# Patient Record
Sex: FEMALE
[De-identification: names, ages, dates, MRNs, and addresses within clinical notes are randomized per-mention and may not be internally consistent; named-entity substitution may affect disease eponyms.]

---

## 2017-09-01 PROBLEM — Z00.00 ENCOUNTER FOR PREVENTIVE HEALTH EXAMINATION: Status: ACTIVE | Noted: 2017-09-01

## 2017-11-08 ENCOUNTER — TRANSCRIPTION ENCOUNTER (OUTPATIENT)
Age: 42
End: 2017-11-08

## 2024-08-27 RX ORDER — HYDROMORPHONE HYDROCHLORIDE 2 MG/1
1 TABLET ORAL ONCE
Refills: 0 | Status: DISCONTINUED | OUTPATIENT
Start: 2024-08-28 | End: 2024-08-28

## 2024-08-27 RX ORDER — ONDANSETRON 2 MG/ML
4 INJECTION, SOLUTION INTRAMUSCULAR; INTRAVENOUS ONCE
Refills: 0 | Status: DISCONTINUED | OUTPATIENT
Start: 2024-08-28 | End: 2024-08-28

## 2024-08-27 NOTE — ASU PATIENT PROFILE, ADULT - NSICDXPASTMEDICALHX_GEN_ALL_CORE_FT
PAST MEDICAL HISTORY:  Adenomyosis of the uterus     Hyperlipidemia     Hypertension     Type 1 diabetes mellitus

## 2024-08-27 NOTE — ASU PATIENT PROFILE, ADULT - BLOOD AVOIDANCE/RESTRICTIONS, PROFILE
"Procedures    Shoulder Subacromial Injection    PREOPERATIVE DIAGNOSIS: Shoulder pain    POSTOPERATIVE DIAGNOSIS: Shoulder pain    PROCEDURE PERFORMED: BILATERAL SUBACROMIAL SHOULDER INJECTION    The patient understands the risks and benefits of the procedure and wishes to proceed. The patient was seen in the preoperative area. Patient's consent was obtained and updated. Vitals were taken. Patient was then placed in a seated position for the injection. Site marked for a lateral approach and prepped with alcohol swabs x 4. A 25 gauge 1.5\"  needle was introduced into the joint space and 3mL of steroid solution containing 2mL 0.25% bupivacaine, and 1mL 40mg Depomedrol was injected after negative aspiration without resistance. The procedure was repeated in all respects on the opposite side. The patient tolerated with no parth-procedural complications.  A sterile dressing was placed over the puncture site.    "
none

## 2024-08-28 ENCOUNTER — TRANSCRIPTION ENCOUNTER (OUTPATIENT)
Age: 49
End: 2024-08-28

## 2024-08-28 ENCOUNTER — OUTPATIENT (OUTPATIENT)
Dept: OUTPATIENT SERVICES | Facility: HOSPITAL | Age: 49
LOS: 1 days | End: 2024-08-28
Payer: COMMERCIAL

## 2024-08-28 VITALS
WEIGHT: 151.9 LBS | DIASTOLIC BLOOD PRESSURE: 67 MMHG | HEART RATE: 76 BPM | SYSTOLIC BLOOD PRESSURE: 139 MMHG | RESPIRATION RATE: 14 BRPM | TEMPERATURE: 98 F | OXYGEN SATURATION: 99 %

## 2024-08-28 VITALS
SYSTOLIC BLOOD PRESSURE: 119 MMHG | DIASTOLIC BLOOD PRESSURE: 44 MMHG | RESPIRATION RATE: 17 BRPM | OXYGEN SATURATION: 99 % | HEART RATE: 79 BPM

## 2024-08-28 DIAGNOSIS — N80.03 ADENOMYOSIS OF THE UTERUS: ICD-10-CM

## 2024-08-28 DIAGNOSIS — N92.0 EXCESSIVE AND FREQUENT MENSTRUATION WITH REGULAR CYCLE: ICD-10-CM

## 2024-08-28 DIAGNOSIS — Z98.818 OTHER DENTAL PROCEDURE STATUS: Chronic | ICD-10-CM

## 2024-08-28 DIAGNOSIS — Z01.818 ENCOUNTER FOR OTHER PREPROCEDURAL EXAMINATION: ICD-10-CM

## 2024-08-28 DIAGNOSIS — D25.9 LEIOMYOMA OF UTERUS, UNSPECIFIED: ICD-10-CM

## 2024-08-28 LAB
ABO RH CONFIRMATION: SIGNIFICANT CHANGE UP
GLUCOSE BLDC GLUCOMTR-MCNC: 177 MG/DL — HIGH (ref 70–99)
GLUCOSE BLDC GLUCOMTR-MCNC: 192 MG/DL — HIGH (ref 70–99)
GLUCOSE BLDC GLUCOMTR-MCNC: 192 MG/DL — HIGH (ref 70–99)
GLUCOSE BLDC GLUCOMTR-MCNC: 215 MG/DL — HIGH (ref 70–99)
HCG UR QL: NEGATIVE — SIGNIFICANT CHANGE UP
HCT VFR BLD CALC: 37 % — SIGNIFICANT CHANGE UP (ref 34.5–45)
HGB BLD-MCNC: 11.9 G/DL — SIGNIFICANT CHANGE UP (ref 11.5–15.5)
MCHC RBC-ENTMCNC: 29.6 PG — SIGNIFICANT CHANGE UP (ref 27–34)
MCHC RBC-ENTMCNC: 32.2 GM/DL — SIGNIFICANT CHANGE UP (ref 32–36)
MCV RBC AUTO: 92 FL — SIGNIFICANT CHANGE UP (ref 80–100)
NRBC # BLD: 0 /100 WBCS — SIGNIFICANT CHANGE UP (ref 0–0)
PLATELET # BLD AUTO: 313 K/UL — SIGNIFICANT CHANGE UP (ref 150–400)
RBC # BLD: 4.02 M/UL — SIGNIFICANT CHANGE UP (ref 3.8–5.2)
RBC # FLD: 14.1 % — SIGNIFICANT CHANGE UP (ref 10.3–14.5)
WBC # BLD: 14.41 K/UL — HIGH (ref 3.8–10.5)
WBC # FLD AUTO: 14.41 K/UL — HIGH (ref 3.8–10.5)

## 2024-08-28 PROCEDURE — 36415 COLL VENOUS BLD VENIPUNCTURE: CPT

## 2024-08-28 PROCEDURE — 88302 TISSUE EXAM BY PATHOLOGIST: CPT | Mod: 26

## 2024-08-28 PROCEDURE — 88302 TISSUE EXAM BY PATHOLOGIST: CPT

## 2024-08-28 PROCEDURE — 81025 URINE PREGNANCY TEST: CPT

## 2024-08-28 PROCEDURE — C1889: CPT

## 2024-08-28 PROCEDURE — C9399: CPT

## 2024-08-28 PROCEDURE — 82962 GLUCOSE BLOOD TEST: CPT

## 2024-08-28 PROCEDURE — 58573 TLH W/T/O UTERUS OVER 250 G: CPT

## 2024-08-28 PROCEDURE — 88307 TISSUE EXAM BY PATHOLOGIST: CPT

## 2024-08-28 PROCEDURE — 85027 COMPLETE CBC AUTOMATED: CPT

## 2024-08-28 PROCEDURE — 88307 TISSUE EXAM BY PATHOLOGIST: CPT | Mod: 26

## 2024-08-28 DEVICE — ARISTA 3GR
Type: IMPLANTABLE DEVICE | Status: NON-FUNCTIONAL
Removed: 2024-08-28

## 2024-08-28 RX ORDER — HYDROMORPHONE HYDROCHLORIDE 2 MG/1
0.5 TABLET ORAL
Refills: 0 | Status: DISCONTINUED | OUTPATIENT
Start: 2024-08-28 | End: 2024-08-28

## 2024-08-28 RX ORDER — ACETAMINOPHEN 325 MG/1
1000 TABLET ORAL ONCE
Refills: 0 | Status: COMPLETED | OUTPATIENT
Start: 2024-08-28 | End: 2024-08-28

## 2024-08-28 RX ORDER — HYDROMORPHONE HYDROCHLORIDE 2 MG/1
0.5 TABLET ORAL ONCE
Refills: 0 | Status: DISCONTINUED | OUTPATIENT
Start: 2024-08-28 | End: 2024-08-28

## 2024-08-28 RX ORDER — CLINDAMYCIN PHOSPHATE 150 MG/ML
900 VIAL (ML) INJECTION ONCE
Refills: 0 | Status: COMPLETED | OUTPATIENT
Start: 2024-08-28 | End: 2024-08-28

## 2024-08-28 RX ORDER — GENTAMICIN 40 MG/ML
80 INJECTION, SOLUTION INTRAMUSCULAR; INTRAVENOUS ONCE
Refills: 0 | Status: COMPLETED | OUTPATIENT
Start: 2024-08-28 | End: 2024-08-28

## 2024-08-28 RX ADMIN — ACETAMINOPHEN 1000 MILLIGRAM(S): 325 TABLET ORAL at 14:21

## 2024-08-28 RX ADMIN — ACETAMINOPHEN 400 MILLIGRAM(S): 325 TABLET ORAL at 13:51

## 2024-08-28 RX ADMIN — HYDROMORPHONE HYDROCHLORIDE 0.5 MILLIGRAM(S): 2 TABLET ORAL at 11:36

## 2024-08-28 RX ADMIN — Medication 2000 MILLILITER(S): at 12:21

## 2024-08-28 RX ADMIN — HYDROMORPHONE HYDROCHLORIDE 0.5 MILLIGRAM(S): 2 TABLET ORAL at 11:23

## 2024-08-28 RX ADMIN — Medication 75 MILLILITER(S): at 11:08

## 2024-08-28 NOTE — ASU DISCHARGE PLAN (ADULT/PEDIATRIC) - NURSING INSTRUCTIONS
Next ibuprofen on or after 4:40pm if needed. Use incentive spirometer 10x/hour while awake for the next 2-3 days. Utilize "blow as you go" technique when making big movements/changes in position. Use pillow or hands to splint incisions/abdomen when coughing or sneezing. Next ibuprofen on/after 4:40pm. Use incentive spirometer 10x/hour while awake for the next 2-3 days. Utilize "blow as you go" technique when making big movements/changes in position. Use pillow or hands to splint incisions/abdomen when coughing or sneezing. Next ibuprofen on/after 4:40pm. Acetaminophen 1000mg given today at 8:00am and 1000mg at 2:00pm. Do not combine additional acetaminophen with prescribed pain medication which also contains acetaminophen.  Use incentive spirometer 10x/hour while awake for the next 2-3 days. Utilize "blow as you go" technique when making big movements/changes in position. Use pillow or hands to splint incisions/abdomen when coughing or sneezing.

## 2024-08-28 NOTE — BRIEF OPERATIVE NOTE - NSICDXBRIEFPREOP_GEN_ALL_CORE_FT
PRE-OP DIAGNOSIS:  Menorrhagia 28-Aug-2024 10:21:37  Heron Rene  Adenomyosis 28-Aug-2024 10:21:44  Heron Rene

## 2024-08-28 NOTE — BRIEF OPERATIVE NOTE - NSICDXBRIEFPOSTOP_GEN_ALL_CORE_FT
POST-OP DIAGNOSIS:  Adenomyosis 28-Aug-2024 10:22:08  Heron Rene  Menorrhagia 28-Aug-2024 10:21:52  Heron Rene

## 2024-08-28 NOTE — ASU DISCHARGE PLAN (ADULT/PEDIATRIC) - CARE PROVIDER_API CALL
Heron Rene  Obstetrics and Gynecology  [address] [place] [address], [address]  [place] [address]  Phone: [phone]  Fax: [phone]  Follow Up Time:

## 2024-08-28 NOTE — ASU DISCHARGE PLAN (ADULT/PEDIATRIC) - ASU DC SPECIAL INSTRUCTIONSFT
Call the office to schedule a post-operative appointment in 2 weeks.    FOR URGENT POSTOPERATIVE PROBLEMS, CALL LYDIA AT DR. CAMPO'S SURGICAL HOTLINE: [phone].

## 2024-08-28 NOTE — BRIEF OPERATIVE NOTE - OPERATION/FINDINGS
Uterus 12-14 weeks, globular with suspected adenomyosis. Normal tubes and ovaries bilaterally; normal upper abdomen. Uneventful total laparoscopic hysterectomy, with bulky uterus removed through vagina in pieces. Cystoscopy demonstrated intact bladder with good ureteral jets bilaterally at case conclusion. EBL = 100 cc.

## 2024-08-28 NOTE — BRIEF OPERATIVE NOTE - NSICDXBRIEFPROCEDURE_GEN_ALL_CORE_FT
PROCEDURES:  Laparoscopic total hysterectomy with bilateral salpingectomy and cystoscopy 28-Aug-2024 10:21:28  Heron Rene

## 2024-08-28 NOTE — ASU DISCHARGE PLAN (ADULT/PEDIATRIC) - CALL YOUR DOCTOR IF YOU HAVE ANY OF THE FOLLOWING:
Bleeding that does not stop/Pain not relieved by Medications/Fever greater than (need to indicate Fahrenheit or Celsius)/Wound/Surgical Site with redness, or foul smelling discharge or pus/Nausea and vomiting that does not stop/Unable to urinate/Inability to tolerate liquids or foods Bleeding that does not stop/Swelling that gets worse/Pain not relieved by Medications/Fever greater than (need to indicate Fahrenheit or Celsius)/Wound/Surgical Site with redness, or foul smelling discharge or pus/Nausea and vomiting that does not stop/Unable to urinate/Inability to tolerate liquids or foods

## 2024-08-28 NOTE — PRE-OP CHECKLIST - INTERNAL PROSTHESES
Outreach attempt was made to schedule a Medicare Wellness Visit. This was the second attempt. Contact was made, [facility] appointment refused.   insulin pump to right flank

## 2024-09-09 LAB — SURGICAL PATHOLOGY STUDY: SIGNIFICANT CHANGE UP

## 2024-09-19 ENCOUNTER — INPATIENT (INPATIENT)
Facility: HOSPITAL | Age: 49
LOS: 0 days | Discharge: HOME | DRG: 379 | End: 2024-09-20
Attending: HOSPITALIST | Admitting: INTERNAL MEDICINE
Payer: COMMERCIAL

## 2024-09-19 VITALS
RESPIRATION RATE: 18 BRPM | WEIGHT: 154.98 LBS | TEMPERATURE: 98 F | HEART RATE: 128 BPM | DIASTOLIC BLOOD PRESSURE: 79 MMHG | HEIGHT: 63 IN | OXYGEN SATURATION: 97 % | SYSTOLIC BLOOD PRESSURE: 119 MMHG

## 2024-09-19 DIAGNOSIS — K92.2 GASTROINTESTINAL HEMORRHAGE, UNSPECIFIED: ICD-10-CM

## 2024-09-19 DIAGNOSIS — I10 ESSENTIAL (PRIMARY) HYPERTENSION: ICD-10-CM

## 2024-09-19 DIAGNOSIS — E78.5 HYPERLIPIDEMIA, UNSPECIFIED: ICD-10-CM

## 2024-09-19 DIAGNOSIS — Z90.710 ACQUIRED ABSENCE OF BOTH CERVIX AND UTERUS: Chronic | ICD-10-CM

## 2024-09-19 DIAGNOSIS — E10.65 TYPE 1 DIABETES MELLITUS WITH HYPERGLYCEMIA: ICD-10-CM

## 2024-09-19 DIAGNOSIS — Z98.818 OTHER DENTAL PROCEDURE STATUS: Chronic | ICD-10-CM

## 2024-09-19 DIAGNOSIS — Z29.9 ENCOUNTER FOR PROPHYLACTIC MEASURES, UNSPECIFIED: ICD-10-CM

## 2024-09-19 DIAGNOSIS — E10.9 TYPE 1 DIABETES MELLITUS WITHOUT COMPLICATIONS: ICD-10-CM

## 2024-09-19 PROBLEM — N80.03 ADENOMYOSIS OF THE UTERUS: Chronic | Status: ACTIVE | Noted: 2024-08-14

## 2024-09-19 LAB
ALBUMIN SERPL ELPH-MCNC: 3.5 G/DL — SIGNIFICANT CHANGE UP (ref 3.3–5)
ALP SERPL-CCNC: 92 U/L — SIGNIFICANT CHANGE UP (ref 40–120)
ALT FLD-CCNC: 47 U/L — SIGNIFICANT CHANGE UP (ref 12–78)
ANION GAP SERPL CALC-SCNC: 8 MMOL/L — SIGNIFICANT CHANGE UP (ref 5–17)
APTT BLD: 26.9 SEC — SIGNIFICANT CHANGE UP (ref 24.5–35.6)
AST SERPL-CCNC: 23 U/L — SIGNIFICANT CHANGE UP (ref 15–37)
BASOPHILS # BLD AUTO: 0.08 K/UL — SIGNIFICANT CHANGE UP (ref 0–0.2)
BASOPHILS NFR BLD AUTO: 1 % — SIGNIFICANT CHANGE UP (ref 0–2)
BILIRUB SERPL-MCNC: 0.5 MG/DL — SIGNIFICANT CHANGE UP (ref 0.2–1.2)
BLD GP AB SCN SERPL QL: SIGNIFICANT CHANGE UP
BUN SERPL-MCNC: 36 MG/DL — HIGH (ref 7–23)
CALCIUM SERPL-MCNC: 9.5 MG/DL — SIGNIFICANT CHANGE UP (ref 8.5–10.1)
CHLORIDE SERPL-SCNC: 103 MMOL/L — SIGNIFICANT CHANGE UP (ref 96–108)
CO2 SERPL-SCNC: 22 MMOL/L — SIGNIFICANT CHANGE UP (ref 22–31)
CREAT SERPL-MCNC: 1.2 MG/DL — SIGNIFICANT CHANGE UP (ref 0.5–1.3)
EGFR: 55 ML/MIN/1.73M2 — LOW
EOSINOPHIL # BLD AUTO: 0.22 K/UL — SIGNIFICANT CHANGE UP (ref 0–0.5)
EOSINOPHIL NFR BLD AUTO: 2.8 % — SIGNIFICANT CHANGE UP (ref 0–6)
GLUCOSE SERPL-MCNC: 332 MG/DL — HIGH (ref 70–99)
HCT VFR BLD CALC: 28 % — LOW (ref 34.5–45)
HCT VFR BLD CALC: 30.7 % — LOW (ref 34.5–45)
HCT VFR BLD CALC: 33.9 % — LOW (ref 34.5–45)
HGB BLD-MCNC: 10.8 G/DL — LOW (ref 11.5–15.5)
HGB BLD-MCNC: 9 G/DL — LOW (ref 11.5–15.5)
HGB BLD-MCNC: 9.8 G/DL — LOW (ref 11.5–15.5)
IMM GRANULOCYTES NFR BLD AUTO: 0.4 % — SIGNIFICANT CHANGE UP (ref 0–0.9)
INR BLD: 1.01 RATIO — SIGNIFICANT CHANGE UP (ref 0.85–1.18)
LIDOCAIN IGE QN: 22 U/L — SIGNIFICANT CHANGE UP (ref 13–75)
LYMPHOCYTES # BLD AUTO: 1.37 K/UL — SIGNIFICANT CHANGE UP (ref 1–3.3)
LYMPHOCYTES # BLD AUTO: 17.3 % — SIGNIFICANT CHANGE UP (ref 13–44)
MCHC RBC-ENTMCNC: 28.7 PG — SIGNIFICANT CHANGE UP (ref 27–34)
MCHC RBC-ENTMCNC: 28.8 PG — SIGNIFICANT CHANGE UP (ref 27–34)
MCHC RBC-ENTMCNC: 31.9 GM/DL — LOW (ref 32–36)
MCHC RBC-ENTMCNC: 31.9 GM/DL — LOW (ref 32–36)
MCV RBC AUTO: 90 FL — SIGNIFICANT CHANGE UP (ref 80–100)
MCV RBC AUTO: 90.4 FL — SIGNIFICANT CHANGE UP (ref 80–100)
MONOCYTES # BLD AUTO: 0.75 K/UL — SIGNIFICANT CHANGE UP (ref 0–0.9)
MONOCYTES NFR BLD AUTO: 9.5 % — SIGNIFICANT CHANGE UP (ref 2–14)
NEUTROPHILS # BLD AUTO: 5.46 K/UL — SIGNIFICANT CHANGE UP (ref 1.8–7.4)
NEUTROPHILS NFR BLD AUTO: 69 % — SIGNIFICANT CHANGE UP (ref 43–77)
NRBC # BLD: 0 /100 WBCS — SIGNIFICANT CHANGE UP (ref 0–0)
NRBC # BLD: 0 /100 WBCS — SIGNIFICANT CHANGE UP (ref 0–0)
OB PNL STL: POSITIVE
PLATELET # BLD AUTO: 417 K/UL — HIGH (ref 150–400)
PLATELET # BLD AUTO: 464 K/UL — HIGH (ref 150–400)
POTASSIUM SERPL-MCNC: 4.5 MMOL/L — SIGNIFICANT CHANGE UP (ref 3.5–5.3)
POTASSIUM SERPL-SCNC: 4.5 MMOL/L — SIGNIFICANT CHANGE UP (ref 3.5–5.3)
PROT SERPL-MCNC: 7.8 G/DL — SIGNIFICANT CHANGE UP (ref 6–8.3)
PROTHROM AB SERPL-ACNC: 11.5 SEC — SIGNIFICANT CHANGE UP (ref 9.5–13)
RBC # BLD: 3.41 M/UL — LOW (ref 3.8–5.2)
RBC # BLD: 3.75 M/UL — LOW (ref 3.8–5.2)
RBC # FLD: 13.9 % — SIGNIFICANT CHANGE UP (ref 10.3–14.5)
RBC # FLD: 14.1 % — SIGNIFICANT CHANGE UP (ref 10.3–14.5)
SODIUM SERPL-SCNC: 133 MMOL/L — LOW (ref 135–145)
WBC # BLD: 7.07 K/UL — SIGNIFICANT CHANGE UP (ref 3.8–10.5)
WBC # BLD: 7.91 K/UL — SIGNIFICANT CHANGE UP (ref 3.8–10.5)
WBC # FLD AUTO: 7.07 K/UL — SIGNIFICANT CHANGE UP (ref 3.8–10.5)
WBC # FLD AUTO: 7.91 K/UL — SIGNIFICANT CHANGE UP (ref 3.8–10.5)

## 2024-09-19 PROCEDURE — 99221 1ST HOSP IP/OBS SF/LOW 40: CPT

## 2024-09-19 PROCEDURE — 99285 EMERGENCY DEPT VISIT HI MDM: CPT

## 2024-09-19 PROCEDURE — 99223 1ST HOSP IP/OBS HIGH 75: CPT | Mod: GC

## 2024-09-19 PROCEDURE — 93010 ELECTROCARDIOGRAM REPORT: CPT

## 2024-09-19 PROCEDURE — 71045 X-RAY EXAM CHEST 1 VIEW: CPT | Mod: 26

## 2024-09-19 RX ORDER — AMLODIPINE BESYLATE 10 MG/1
10 TABLET ORAL DAILY
Refills: 0 | Status: DISCONTINUED | OUTPATIENT
Start: 2024-09-19 | End: 2024-09-20

## 2024-09-19 RX ORDER — ONDANSETRON 2 MG/ML
4 INJECTION, SOLUTION INTRAMUSCULAR; INTRAVENOUS EVERY 8 HOURS
Refills: 0 | Status: DISCONTINUED | OUTPATIENT
Start: 2024-09-19 | End: 2024-09-20

## 2024-09-19 RX ORDER — LISINOPRIL 10 MG/1
20 TABLET ORAL DAILY
Refills: 0 | Status: DISCONTINUED | OUTPATIENT
Start: 2024-09-19 | End: 2024-09-20

## 2024-09-19 RX ORDER — DEXTROSE 15 G/33 G
15 GEL IN PACKET (GRAM) ORAL ONCE
Refills: 0 | Status: DISCONTINUED | OUTPATIENT
Start: 2024-09-19 | End: 2024-09-20

## 2024-09-19 RX ORDER — PANTOPRAZOLE SODIUM 40 MG
40 TABLET, DELAYED RELEASE (ENTERIC COATED) ORAL EVERY 12 HOURS
Refills: 0 | Status: DISCONTINUED | OUTPATIENT
Start: 2024-09-19 | End: 2024-09-20

## 2024-09-19 RX ORDER — ACETAMINOPHEN 325 MG/1
650 TABLET ORAL EVERY 6 HOURS
Refills: 0 | Status: DISCONTINUED | OUTPATIENT
Start: 2024-09-19 | End: 2024-09-20

## 2024-09-19 RX ORDER — MAGNESIUM, ALUMINUM HYDROXIDE 200-225/5
30 SUSPENSION, ORAL (FINAL DOSE FORM) ORAL EVERY 4 HOURS
Refills: 0 | Status: DISCONTINUED | OUTPATIENT
Start: 2024-09-19 | End: 2024-09-20

## 2024-09-19 RX ORDER — ONDANSETRON 2 MG/ML
4 INJECTION, SOLUTION INTRAMUSCULAR; INTRAVENOUS ONCE
Refills: 0 | Status: COMPLETED | OUTPATIENT
Start: 2024-09-19 | End: 2024-09-19

## 2024-09-19 RX ORDER — DEXTROSE 15 G/33 G
12.5 GEL IN PACKET (GRAM) ORAL ONCE
Refills: 0 | Status: DISCONTINUED | OUTPATIENT
Start: 2024-09-19 | End: 2024-09-20

## 2024-09-19 RX ORDER — DEXTROSE 15 G/33 G
25 GEL IN PACKET (GRAM) ORAL ONCE
Refills: 0 | Status: DISCONTINUED | OUTPATIENT
Start: 2024-09-19 | End: 2024-09-20

## 2024-09-19 RX ORDER — GLUCAGON INJECTION, SOLUTION 1 MG/.2ML
1 INJECTION, SOLUTION SUBCUTANEOUS ONCE
Refills: 0 | Status: DISCONTINUED | OUTPATIENT
Start: 2024-09-19 | End: 2024-09-20

## 2024-09-19 RX ORDER — PANTOPRAZOLE SODIUM 40 MG
40 TABLET, DELAYED RELEASE (ENTERIC COATED) ORAL ONCE
Refills: 0 | Status: COMPLETED | OUTPATIENT
Start: 2024-09-19 | End: 2024-09-19

## 2024-09-19 RX ORDER — IBUPROFEN 600 MG
3 TABLET ORAL
Qty: 0 | Refills: 0 | DISCHARGE

## 2024-09-19 RX ORDER — OXYCODONE AND ACETAMINOPHEN 7.5; 325 MG/1; MG/1
1 TABLET ORAL
Qty: 0 | Refills: 0 | DISCHARGE

## 2024-09-19 RX ORDER — METOPROLOL TARTRATE 100 MG/1
100 TABLET ORAL DAILY
Refills: 0 | Status: DISCONTINUED | OUTPATIENT
Start: 2024-09-19 | End: 2024-09-20

## 2024-09-19 RX ORDER — SODIUM CHLORIDE 9 MG/ML
1000 INJECTION INTRAMUSCULAR; INTRAVENOUS; SUBCUTANEOUS ONCE
Refills: 0 | Status: COMPLETED | OUTPATIENT
Start: 2024-09-19 | End: 2024-09-19

## 2024-09-19 RX ORDER — SODIUM CHLORIDE 9 MG/ML
1000 INJECTION INTRAMUSCULAR; INTRAVENOUS; SUBCUTANEOUS
Refills: 0 | Status: DISCONTINUED | OUTPATIENT
Start: 2024-09-19 | End: 2024-09-20

## 2024-09-19 RX ADMIN — ACETAMINOPHEN 650 MILLIGRAM(S): 325 TABLET ORAL at 23:01

## 2024-09-19 RX ADMIN — Medication 40 MILLIGRAM(S): at 18:30

## 2024-09-19 RX ADMIN — Medication 40 MILLIGRAM(S): at 12:14

## 2024-09-19 RX ADMIN — LISINOPRIL 20 MILLIGRAM(S): 10 TABLET ORAL at 22:02

## 2024-09-19 RX ADMIN — AMLODIPINE BESYLATE 10 MILLIGRAM(S): 10 TABLET ORAL at 22:01

## 2024-09-19 RX ADMIN — Medication 3 MILLIGRAM(S): at 22:00

## 2024-09-19 RX ADMIN — Medication 20 MILLIGRAM(S): at 22:01

## 2024-09-19 RX ADMIN — ONDANSETRON 4 MILLIGRAM(S): 2 INJECTION, SOLUTION INTRAMUSCULAR; INTRAVENOUS at 18:31

## 2024-09-19 RX ADMIN — ACETAMINOPHEN 650 MILLIGRAM(S): 325 TABLET ORAL at 22:01

## 2024-09-19 RX ADMIN — SODIUM CHLORIDE 1000 MILLILITER(S): 9 INJECTION INTRAMUSCULAR; INTRAVENOUS; SUBCUTANEOUS at 12:13

## 2024-09-19 RX ADMIN — METOPROLOL TARTRATE 100 MILLIGRAM(S): 100 TABLET ORAL at 22:01

## 2024-09-19 NOTE — H&P ADULT - HISTORY OF PRESENT ILLNESS
48F with PMH of DM1, on insulin pump, HTN, HLD, adenomyosis of the uterus s/p hysterectomy presents to the ED for tarry stools. Pt had a hysterectomy three weeks ago. Pt took both ibuprofen and percocet for one day after surgery and only took percocet for the remainder of that first week. Had only one episode of vaginal bleeding after the surgery when she was also diagnosed with a UTI. Was treated but pt unable to recall medication name. No more instances of vaginal bleeding occurred after that one time. Pt had a recent visit with her Gyn, Dr. Rene who said she was doing very well. Pt had three episodes of black stools this morning: first BM at 7:15am, second at 8:15am, and third time at 9:45am. Pt also had one episode of dark emesis at 10:30am this morning. States she was having slight abdominal pain yesterday but thought it was due to acid reflux. No longer having any abdominal pain today. Does admit feeling lightheaded and dizzy when moving around. Denies taking any NSAIDs after the week post surgery.     Denies fever, chills, chest pain, palpitations, SOB, cough, abdominal pain, nausea, constipation, hematochezia,  urinary frequency, urgency, dysuria, hematuria, headaches, changes in vision, dizziness, numbness, tingling. No other complaints at this time.      ED course:  Vital Signs T(F): 97.9  HR: 128  BP: 119/79  RR: 18  SpO2:97 % on RA  Labs significant for: Hgb: 9.8, hct: 30.7, PLT: 417, Na: 133. BUN: 133, BUN: 36, Glu: 332, rGFR: 55  EKG: NSR, vent rate: 84, Qt/QTc: 376/444  CXR: no acute findings   In ED given 1x NS bolus, protonix 40mg IVP 1x, zofran 4mg IVP 1x    Imaging  CXR:  CT head:  CT a/p:   48F with PMH of DM1, on insulin pump, HTN, HLD, adenomyosis of the uterus s/p hysterectomy presents to the ED for tarry stools. Pt had a hysterectomy three weeks ago. Pt took both ibuprofen and percocet for one day after surgery and only took percocet for the remainder of that first week. Had only one episode of vaginal bleeding after the surgery when she was also diagnosed with a UTI. Was treated with abx but pt unable to recall medication name. No more instances of vaginal bleeding occurred after that one time. Pt had a recent visit with her Gyn, Dr. Rene who said she was doing very well. Pt had three episodes of black stools this morning: first BM at 7:15am, second at 8:15am, and third time at 9:45am. Pt also had one episode of dark emesis at 10:30am this morning. States she was having slight abdominal pain yesterday but thought it was due to acid reflux. No longer having any abdominal pain today. Does admit feeling lightheaded and dizzy when moving around. Denies taking any NSAIDs after the week post surgery.     Denies fever, chills, chest pain, palpitations, SOB, cough, abdominal pain, nausea, constipation, hematochezia,  urinary frequency, urgency, dysuria, hematuria, headaches, changes in vision, dizziness, numbness, tingling. No other complaints at this time.      ED course:  Vital Signs T(F): 97.9  HR: 128  BP: 119/79  RR: 18  SpO2:97 % on RA  Labs significant for: Hgb: 9.8, hct: 30.7, PLT: 417, Na: 133. BUN: 133, BUN: 36, Glu: 332, rGFR: 55  EKG: NSR, vent rate: 84, Qt/QTc: 376/444  CXR: no acute findings   In ED given 1x NS bolus, protonix 40mg IVP 1x, zofran 4mg IVP 1x

## 2024-09-19 NOTE — H&P ADULT - PROBLEM SELECTOR PLAN 1
Patient presents with 3 episodes of tarry stools likely 2/2 GIB, had a recent hysterectiomy 3 wks ago  - Admit to [facility]  - H/H 10.8/33.9on admission, repeat H/H 9.8/30.7, baseline hemoglobin 12  - NPO (except meds) for possible procedure/colonscopy to evaluate for source of bleeding, advance diet as tolerated  - Protonix 40 mg IVP BID  - Hold antiplatelets, NSAIDs at this time, will restart pending clinical course  - SCDs, hold pharmacologic DVT ppx  - Currently hemodynamically stable, monitor for signs and symptoms of active bleeding  - Consider transfusion for hemoglobin <7 (or <8 if history of CAD)  - Blood transfusion consent signed and placed in chart  - Type and screen up to date  - If profuse bleeding and/or hemodynamically unstable, obtain CT angiogram to localize bleeding and IR consultation for possible embolization  - F/u vitamin B12, folate, iron panel: iron, ferritin, TIBC, transferrin  - GI (Dr. Muñoz) consulted, f/u recs Patient presents with 3 episodes of tarry stools likely 2/2 GIB, had a recent hysterectiomy 3 wks ago  - Admit to [facility]  - H/H 10.8/33.9on admission, repeat H/H 9.8/30.7, baseline hemoglobin 12  - advance diet  - Protonix 40 mg IVP BID  - Hold antiplatelets, NSAIDs at this time, will restart pending clinical course  - SCDs, hold pharmacologic DVT ppx  - Currently hemodynamically stable, monitor for signs and symptoms of active bleeding  - Consider transfusion for hemoglobin <7 (or <8 if history of CAD)  - Blood transfusion consent signed and placed in chart  - Type and screen up to date  - If profuse bleeding and/or hemodynamically unstable, obtain CT angiogram to localize bleeding and IR consultation for possible embolization  - F/u vitamin B12, folate, iron panel: iron, ferritin, TIBC, transferrin  - GI (Dr. Muñoz) consulted, f/u recs Patient presents with 3 episodes of tarry stools likely 2/2 GIB, had a recent hysterectomy 3 wks ago  - Admit to [facility]  - H/H 10.8/33.9on admission, repeat H/H 9.8/30.7, baseline hemoglobin 12  - repeat H/H overnight   - advance diet  - Protonix 40 mg IVP BID  - Hold antiplatelets, NSAIDs at this time, will restart pending clinical course  - SCDs, hold pharmacologic DVT ppx  - Currently hemodynamically stable, monitor for signs and symptoms of active bleeding  - Consider transfusion for hemoglobin <7 (or <8 if history of CAD)  - Type and screen up to date  - If profuse bleeding and/or hemodynamically unstable, obtain CT angiogram to localize bleeding and IR consultation for possible embolization  - F/u vitamin B12, folate, iron panel: iron, ferritin, TIBC, transferrin  - GI (Dr. Muñoz) consulted, f/u recs Patient presents with 3 episodes of tarry stools likely 2/2 GIB, had a recent hysterectomy 3 wks ago  - Admit to [facility]  - H/H 10.8/33.9on admission, repeat H/H 9.8/30.7, baseline hemoglobin 12  - repeat H/H overnight   - advance diet  - Protonix 40 mg IVP BID  - Currently hemodynamically stable, monitor for signs and symptoms of active bleeding  - Consider transfusion for hemoglobin <7 (or <8 if history of CAD)  - Type and screen up to date  - If profuse bleeding and/or hemodynamically unstable, obtain CT angiogram to localize bleeding and IR consultation for possible embolization  - Diet regular, will be NPO after midnight   - F/u vitamin B12, folate, iron panel: iron, ferritin, TIBC, transferrin  - GI (Dr. Muñoz) consulted, f/u recs

## 2024-09-19 NOTE — CONSULT NOTE ADULT - CONSULT REASON
gi bleed
48y A1C with Estimated Average Glucose Result: 7.3 % (08-14-24 @ 09:40)   diabetes mellitus uncontrolled type 1

## 2024-09-19 NOTE — ED PROVIDER NOTE - CLINICAL SUMMARY MEDICAL DECISION MAKING FREE TEXT BOX
49yo F ho DM1, on insulin pump, htn, hld, pw black stools today. pt had hysterectomy 3 weeks ago, has been recovering well, today had abdominal discomfort and went ot bathroom and had 3 episodes of black stools, aslo had dark emesis once. no abd pain now, feels lightheaded and dizzy. does take nsaids often   concern fro GI bleed, labs, ekg, h/h, occult, protonix

## 2024-09-19 NOTE — H&P ADULT - NSICDXFAMILYHX_GEN_ALL_CORE_FT
FAMILY HISTORY:  Father  Still living? No  FH: hyperlipidemia, Age at diagnosis: Age Unknown  FH: hypertension, Age at diagnosis: Age Unknown  FH: kidney failure, Age at diagnosis: Age Unknown    Mother  Still living? Yes, Estimated age: Age Unknown  Family history of type 2 diabetes mellitus in mother, Age at diagnosis: Age Unknown  FH: hyperlipidemia, Age at diagnosis: Age Unknown  FH: hypertension, Age at diagnosis: Age Unknown    Sibling  Still living? Yes, Estimated age: Age Unknown  Family history of type 2 diabetes mellitus in sister, Age at diagnosis: Age Unknown

## 2024-09-19 NOTE — CONSULT NOTE ADULT - NSCONSULTADDITIONALINFOA_GEN_ALL_CORE
rpt hgb declined  will need admission to medicine for  upper gastrointestinal endoscopy in am  clears ok today  npo p mn

## 2024-09-19 NOTE — CONSULT NOTE ADULT - SUBJECTIVE AND OBJECTIVE BOX
[facility]    Prince David Sheth NP    [address] [place] [address] [phone]      Chief Complaint:  Patient is a 48y old  Female who presents with a chief complaint of     HPI:47yo F ho DM1, on insulin pump, htn, hld, pw black stools today. pt had hysterectomy 3 weeks ago, has been recovering well, today had abdominal discomfort and went ot bathroom and had 3 episodes of black stools, aslo had dark emesis once. no abd pain now, feels lightheaded and dizzy. does take nsaids often    Allergies:  penicillin (Rash)      Medications:      PMHX/PSHX:  Type 1 diabetes mellitus    Hypertension    Hyperlipidemia    Adenomyosis of the uterus    S/P wisdom tooth extraction        Family history:  FH: hypertension (Mother)    Family history of type 2 diabetes mellitus in mother (Mother)    FH: kidney failure (Father)    Family history of type 2 diabetes mellitus in sister (Sibling)        Social History:     ROS:     General:  No wt loss, fevers, chills, night sweats, fatigue,   Eyes:  Good vision, no reported pain  ENT:  No sore throat, pain, runny nose, dysphagia  CV:  No pain, palpitations, hypo/hypertension  Resp:  No dyspnea, cough, tachypnea, wheezing  GI:  No pain, No nausea, No vomiting, No diarrhea, No constipation, No weight loss, No fever, No pruritis, No rectal bleeding, + tarry stools, No dysphagia,  :  No pain, bleeding, incontinence, nocturia  Muscle:  No pain, weakness  Neuro:  No weakness, tingling, memory problems  Psych:  No fatigue, insomnia, mood problems, depression  Endocrine:  No polyuria, polydipsia, cold/heat intolerance  Heme:  No petechiae, ecchymosis, easy bruisability  Skin:  No rash, tattoos, scars, edema      PHYSICAL EXAM:   Vital Signs:  Vital Signs Last 24 Hrs  T(C): 36.6 (19 Sep 2024 11:10), Max: 36.6 (19 Sep 2024 11:10)  T(F): 97.9 (19 Sep 2024 11:10), Max: 97.9 (19 Sep 2024 11:10)  HR: 128 (19 Sep 2024 11:10) (128 - 128)  BP: 119/79 (19 Sep 2024 11:10) (119/79 - 119/79)  BP(mean): --  RR: 18 (19 Sep 2024 11:10) (18 - 18)  SpO2: 97% (19 Sep 2024 11:10) (97% - 97%)    Parameters below as of 19 Sep 2024 11:10  Patient On (Oxygen Delivery Method): room air      Daily Height in cm: 160.02 (19 Sep 2024 11:10)    Daily     GENERAL:  Appears stated age, well-groomed, well-nourished, no distress  HEENT:  NC/AT,  conjunctivae clear and pink, no thyromegaly, nodules, adenopathy, no JVD, sclera -anicteric  CHEST:  Full & symmetric excursion, no increased effort, breath sounds clear  HEART:  Regular rhythm, S1, S2, no murmur/rub/S3/S4, no abdominal bruit, no edema  ABDOMEN:  Soft, non-tender, non-distended, normoactive bowel sounds,  no masses ,no hepato-splenomegaly, no signs of chronic liver disease  EXTEREMITIES:  no cyanosis,clubbing or edema  SKIN:  No rash/erythema/ecchymoses/petechiae/wounds/abscess/warm/dry  NEURO:  Alert, oriented, no asterixis, no tremor, no encephalopathy    LABS:                        10.8   7.91  )-----------( 464      ( 19 Sep 2024 11:40 )             33.9     09-19    133[L]  |  103  |  36[H]  ----------------------------<  332[H]  4.5   |  22  |  1.20    Ca    9.5      19 Sep 2024 11:40    TPro  7.8  /  Alb  3.5  /  TBili  0.5  /  DBili  x   /  AST  23  /  ALT  47  /  AlkPhos  92  09-19    LIVER FUNCTIONS - ( 19 Sep 2024 11:40 )  Alb: 3.5 g/dL / Pro: 7.8 g/dL / ALK PHOS: 92 U/L / ALT: 47 U/L / AST: 23 U/L / GGT: x           PT/INR - ( 19 Sep 2024 11:40 )   PT: 11.5 sec;   INR: 1.01 ratio         PTT - ( 19 Sep 2024 11:40 )  PTT:26.9 sec  Urinalysis Basic - ( 19 Sep 2024 11:40 )    Color: x / Appearance: x / SG: x / pH: x  Gluc: 332 mg/dL / Ketone: x  / Bili: x / Urobili: x   Blood: x / Protein: x / Nitrite: x   Leuk Esterase: x / RBC: x / WBC x   Sq Epi: x / Non Sq Epi: x / Bacteria: x      Amylase Serum--      Lipase serum22       Ammonia--      Imaging:              
Patient is a 48y old  Female who presents with a chief complaint of GI Bleed (19 Sep 2024 16:16)    Type: 1 DM DX age 27, no known complications Endocrine Dr Keren Pitts Last seen 1 month ago, clearance for hysterectomy. a1c 7.4% Rx home humalog in medtronic minimed insulin pump with guardian CGM, operates in automated mode. current settings basal profile, doing f/s TIDAC for bolusing  12a 1.1 u/h                                   ICR 12a 1:10  7a 0.8 u/h                                     ISF 12a 1:50  10a 0.85u/h                                  BGT   11a 0.775 u/h                                active time 4 hours  2p 0.95 u/h  5p 0.975 u/h  9p 1 u/h    reviewed BG >350 in AM, gave 5+1 unit bolus, serum , accuchecks ACHS CC diet, npo after midnight, discussed w/ patient to maintain pump, signed attestation and self-assessment, reviewed s/s of hyper/hypo, management while NPO, pt family will bring additional supplies. pt reports no needs, verbal education and handouts provided  diabetes education provided- A1c measure and BG targets  fasting, <180 2 hours postmeal. inhospital BGM frequency and insulin pump management, s/s of hyperglycemia/hypoglycemia and management, glycemic control and preventing complications, consistent carb diet, balanced plate method, consistent meal planning. sick day management, provider f/u  HxHLD, hysterectomy    HPI:  48F with PMH of DM1, on insulin pump, HTN, HLD, adenomyosis of the uterus s/p hysterectomy presents to the ED for tarry stools. Pt had a hysterectomy three weeks ago. Pt took both ibuprofen and percocet for one day after surgery and only took percocet for the remainder of that first week. Had only one episode of vaginal bleeding after the surgery when she was also diagnosed with a UTI. Was treated with abx but pt unable to recall medication name. No more instances of vaginal bleeding occurred after that one time. Pt had a recent visit with her Gyn, Dr. Rene who said she was doing very well. Pt had three episodes of black stools this morning: first BM at 7:15am, second at 8:15am, and third time at 9:45am. Pt also had one episode of dark emesis at 10:30am this morning. States she was having slight abdominal pain yesterday but thought it was due to acid reflux. No longer having any abdominal pain today. Does admit feeling lightheaded and dizzy when moving around. Denies taking any NSAIDs after the week post surgery.     Denies fever, chills, chest pain, palpitations, SOB, cough, abdominal pain, nausea, constipation, hematochezia,  urinary frequency, urgency, dysuria, hematuria, headaches, changes in vision, dizziness, numbness, tingling. No other complaints at this time.      ED course:  Vital Signs T(F): 97.9  HR: 128  BP: 119/79  RR: 18  SpO2:97 % on RA  Labs significant for: Hgb: 9.8, hct: 30.7, PLT: 417, Na: 133. BUN: 133, BUN: 36, Glu: 332, rGFR: 55  EKG: NSR, vent rate: 84, Qt/QTc: 376/444  CXR: no acute findings   In ED given 1x NS bolus, protonix 40mg IVP 1x, zofran 4mg IVP 1x       (19 Sep 2024 16:16)      PAST MEDICAL & SURGICAL HISTORY:  Type 1 diabetes mellitus      Hypertension      Hyperlipidemia      Adenomyosis of the uterus      S/P wisdom tooth extraction      S/P hysterectomy    Allergies    penicillin (Rash)    Intolerances        MEDICATIONS  (STANDING):  dextrose 5%. 1000 milliLiter(s) (50 mL/Hr) IV Continuous <Continuous>  dextrose 5%. 1000 milliLiter(s) (100 mL/Hr) IV Continuous <Continuous>  dextrose 50% Injectable 25 Gram(s) IV Push once  dextrose 50% Injectable 12.5 Gram(s) IV Push once  dextrose 50% Injectable 25 Gram(s) IV Push once  glucagon  Injectable 1 milliGRAM(s) IntraMuscular once  insulin lispro (HumaLOG) Pump 1  SubCutaneous Continuous Pump

## 2024-09-19 NOTE — PATIENT PROFILE ADULT - FALL HARM RISK - UNIVERSAL INTERVENTIONS
Bed in lowest position, wheels locked, appropriate side rails in place/Call bell, personal items and telephone in reach/Instruct patient to call for assistance before getting out of bed or chair/Non-slip footwear when patient is out of bed/[place] to call system/Physically safe environment - no spills, clutter or unnecessary equipment/Purposeful Proactive Rounding/Room/bathroom lighting operational, light cord in reach

## 2024-09-19 NOTE — CONSULT NOTE ADULT - PROBLEM SELECTOR RECOMMENDATION 9
Type 1 A1c 7.4% adm GI bleed  c/w humalog in insulin pump @ current settings  CC diet and accucheck ACHS  switch to q6 hours when NPO after midnight   Recommend endocrine-[facility] onconsult  FU appt: Dr. Keren Pitts  DSC recommendations: return to home regimen humalog in insulin pump and continuous glucose monitoring, lifestyle and diet modifications  diabetes education provided as documented above  Diabetes support info and cell # [phone] given   Goal 100-180 mg/dL; 140-180 mg/dL in critical care areas

## 2024-09-19 NOTE — ED PROVIDER NOTE - NSICDXFAMILYHX_GEN_ALL_CORE_FT
Daily Note     Today's date: 3/6/2023  Patient name: Lesley Bloch  : 1950  MRN: [id number]  Referring provider: Nhan Rodgers DPM  Dx:   Encounter Diagnosis     ICD-10-CM    1  Plantar fasciitis of left foot  M72 2       2  Pain of left heel  M79 672           Start Time: 1530  Stop Time: 1615  Total time in clinic (min): 45 minutes    Subjective: The patient presents for the first follow-up appointment, reports that symptoms stayed the same and that she was compliant most of the time with the initial HEP        Objective: See treatment diary below      Assessment: The patient tolerated manual and active treatment well today  The PT reviewed IHEP and introduced initial manual and ther-ex program during today's treatment  The patient demonstrated good response to today's treatment  Plan: Continue per plan of care        Precautions: Standard      Manuals 3/1/23 3/6           Talocrural joint mobs (gr II-III)  SRB           Subtalar joint mobs (gr II-III)  SRB           Midfoot mobs (gr II-III)             IASTM to heel  SRB                                                  Neuro Re-Ed             Short foot             Towel curl             BAPS                                                                 Ther Ex             Active warmup             Gastroc/soleus stretch HEP Review           Ankle 4 ways   GTB PF/inv/ev x30 ea, HEP           Toe flexion             Heel raise  Standing, DL x30 HEP           Squat                                       Ther Activity             Single leg balance                                                                 Gait Training             Step-ups                                                                 Assessment IE            Education Prognosis, HEP, POC FAMILY HISTORY:  Father  Still living? No  FH: kidney failure, Age at diagnosis: Age Unknown    Mother  Still living? Yes, Estimated age: Age Unknown  Family history of type 2 diabetes mellitus in mother, Age at diagnosis: Age Unknown  FH: hypertension, Age at diagnosis: Age Unknown    Sibling  Still living? Yes, Estimated age: Age Unknown  Family history of type 2 diabetes mellitus in sister, Age at diagnosis: Age Unknown

## 2024-09-19 NOTE — ED ADULT NURSE NOTE - NSICDXFAMILYHX_GEN_ALL_CORE_FT
FAMILY HISTORY:  Father  Still living? No  FH: kidney failure, Age at diagnosis: Age Unknown    Mother  Still living? Yes, Estimated age: Age Unknown  Family history of type 2 diabetes mellitus in mother, Age at diagnosis: Age Unknown  FH: hypertension, Age at diagnosis: Age Unknown    Sibling  Still living? Yes, Estimated age: Age Unknown  Family history of type 2 diabetes mellitus in sister, Age at diagnosis: Age Unknown

## 2024-09-19 NOTE — H&P ADULT - PROBLEM SELECTOR PLAN 4
Pt has Insulin pump with humalog   - glucose: 332 Pt has Insulin pump  - glucose: 332  -Diabetes NP consulted   -Endo consult Pt has Insulin pump  - glucose: 332  -Diabetes NP consulted   -Endo consult, Dr. Perlman f/u recs

## 2024-09-19 NOTE — ED ADULT NURSE NOTE - OBJECTIVE STATEMENT
Pt presents to the ED c/o 3 episodes of black tarry stools and black emesis this AM. Pt had hysterectomy on 08/28, recently followed up with ob-gyn, incision site well appearing no signs of infection. Abdomen non tender upon palpation. IV established in left arm with a 20G, labs drawn and sent, call bell in reach, warm blanket provided, bed in lowest position, side rails up x2, MD evaluation in progress, pt on telemetry. Pt denies pain at this time, denies fevers, Pt presents to the ED c/o 3 episodes of black tarry stools and black emesis this AM. Pt had hysterectomy on 08/28, recently followed up with ob-gyn, incision site well appearing no signs of infection. Abdomen non tender upon palpation. IV established in left arm with a 20G, labs drawn and sent, call bell in reach, warm blanket provided, bed in lowest position, side rails up x2, MD evaluation in progress, pt on telemetry. Pt denies pain at this time, denies fevers, chills, n/v/d.

## 2024-09-19 NOTE — H&P ADULT - NSHPREVIEWOFSYSTEMS_GEN_ALL_CORE
CONSTITUTIONAL: denies fever, chills, diaphoresis, fatigue, weakness, dizziness, lightheadedness  HEENT: denies blurred vision, sore throat, cough  SKIN: denies new lesions, rash, hives, itching  CARDIOVASCULAR: denies chest pain, chest pressure, palpitations  RESPIRATORY: denies shortness of breath, RIVERA, wheezing, sputum production  GASTROINTESTINAL: +dark emesis 1x, +tarry stools 3x, denies nausea, constipation, abdominal pain,   GENITOURINARY: denies dysuria, polyuria, hematuria, discharge  NEUROLOGICAL: denies syncope, LOC, numbness, headache, focal weakness  MUSCULOSKELETAL: denies new joint pain, joint swelling, muscle aches  HEMATOLOGIC: denies gross bleeding, bruising  LYMPHATICS: denies enlarged lymph nodes, extremity swelling  PSYCHIATRIC: denies recent changes in anxiety, depression  ENDOCRINOLOGIC: denies sweating, cold or heat intolerance CONSTITUTIONAL: +lightheadedness, +dizziness, denies fever, chills, diaphoresis, fatigue, weakness  HEENT: denies blurred vision, sore throat, cough  SKIN: denies new lesions, rash, hives, itching  CARDIOVASCULAR: denies chest pain, chest pressure, palpitations  RESPIRATORY: denies shortness of breath, RIVERA, wheezing, sputum production  GASTROINTESTINAL: +dark emesis 1x, +tarry stools 3x, denies nausea, constipation, abdominal pain,   GENITOURINARY: denies dysuria, polyuria, hematuria, discharge  NEUROLOGICAL: denies syncope, LOC, numbness, headache, focal weakness  MUSCULOSKELETAL: denies new joint pain, joint swelling, muscle aches  HEMATOLOGIC: denies gross bleeding, bruising  LYMPHATICS: denies enlarged lymph nodes, extremity swelling  PSYCHIATRIC: denies recent changes in anxiety, depression  ENDOCRINOLOGIC: denies sweating, cold or heat intolerance

## 2024-09-19 NOTE — CONSULT NOTE ADULT - ASSESSMENT
melena  gi bleed     suspect this is mild GI bleed in setting of recent surgery and nsaid use  mild decline in hgb  await repeat  proton pump inhibitor bid  hopefully she can be dced with outpatient follow up pending repeat cbc  d/w patient  d/w ED 
Physical Exam:   Vital Signs Last 24 Hrs  T(C): 36.4 (19 Sep 2024 15:50), Max: 36.6 (19 Sep 2024 11:10)  T(F): 97.5 (19 Sep 2024 15:50), Max: 97.9 (19 Sep 2024 11:10)  HR: 91 (19 Sep 2024 15:50) (91 - 128)  BP: 152/73 (19 Sep 2024 15:50) (119/79 - 152/73)  BP(mean): --  RR: 18 (19 Sep 2024 15:50) (18 - 18)  SpO2: 100% (19 Sep 2024 15:50) (97% - 100%)    Parameters below as of 19 Sep 2024 15:50  Patient On (Oxygen Delivery Method): room air     CAPILLARY BLOOD GLUCOSE          Cholesterol, Serum: 113 mg/dL (05.19.21 @ 08:36)     HDL Cholesterol, Serum: 22 mg/dL (05.19.21 @ 08:36)     LDL Cholesterol Calculated: 66 mg/dL (05.19.21 @ 08:36)     DIET: CC  >50%

## 2024-09-19 NOTE — H&P ADULT - NSHPPHYSICALEXAM_GEN_ALL_CORE
PHYSICAL EXAM:  General: Lying in bed comfortably. No acute distress. Pleasant.  Head: Atraumatic, normocephalic.  Eyes: EOMI, PERRLA. Conjunctiva and sclera clear.  ENT: Moist mucous membranes. No exudates.   Neck: Supple. No JVD.  Heart: Normal S1, S2. Regular rate and rhythm. No murmurs, rubs, or gallops.  Lungs: Clear to auscultation bilaterally. Symmetric breath sounds. No crackles, wheezing, or rhonchi.  Abdomen: Soft, nontender, nondistended. Normoactive bowel sounds present. No palpable masses.  Extremities: 2+ Peripheral pulses bilaterally. No clubbing, cyanosis. No edema.  Nervous System: A&Ox3. Answers questions appropriately. Follow commands. Able to move all 4 extremities.  Skin: No obvious lesions. Warm skin, appears well perfused. Dry and cool.  Psychiatric: No changes in mood. Affect is congruent. Linear and logical thought process.  Heme/Lymph: No obvious ecchymosis or petechiae. No palpable supraclavicular nodules.

## 2024-09-19 NOTE — H&P ADULT - ASSESSMENT
48F with PMH of DM1, on insulin pump, HTN, HLD, adenomyosis of the uterus s/p hysterectomy presents to the ED for tarry stools and is being admitted for acute GI bleed.     ED course:  Vital Signs T(F): 97.9  HR: 128  BP: 119/79  RR: 18  SpO2:97 % on RA  Labs significant for: Hgb: 9.8, hct: 30.7, PLT: 417, Na: 133. BUN: 133, BUN: 36, Glu: 332, rGFR: 55  EKG: NSR, vent rate: 84, Qt/QTc: 376/444  CXR: no acute findings   In ED given 1x NS bolus, protonix 40mg IVP 1x, zofran 4mg IVP 1x   48F with PMH of DM1, on insulin pump, HTN, HLD, adenomyosis of the uterus s/p hysterectomy presents to the ED for tarry stools and is being admitted for acute GI bleed.

## 2024-09-19 NOTE — H&P ADULT - NSHPSOCIALHISTORY_GEN_ALL_CORE
Tobacco: None  ETOH: usually 1-2 drinks per night; but after recent hysterectomy 1-2 drinks on weekends  Recreational/Illicit Drugs: None  Lives with:   Sex: Female  Ambulation: no assistance  ADLs: Independent   Dietary Restrictions: None

## 2024-09-19 NOTE — ED ADULT TRIAGE NOTE - CHIEF COMPLAINT QUOTE
pt A&Ox4 ambulatory to triage s/p hysterectomy 8/28 with c/o black tarry stools and black emesis. has had some abdominal discomfort since last night. has SOB with exertion and tachycardic

## 2024-09-19 NOTE — ED PROVIDER NOTE - OBJECTIVE STATEMENT
47yo F ho DM1, on insulin pump, htn, hld, pw black stools today. pt had hysterectomy 3 weeks ago, has been recovering well, today had abdominal discomfort and went ot bathroom and had 3 episodes of black stools, aslo had dark emesis once. no abd pain now, feels lightheaded and dizzy. does take nsaids often

## 2024-09-19 NOTE — H&P ADULT - ATTENDING COMMENTS
48F with PMH of DM1, on insulin pump, HTN, HLD, adenomyosis of the uterus s/p hysterectomy presents to the ED for tarry stools and is being admitted for acute GI bleed.     HPI as above.     T(C): 36.4 (09-19-24 @ 15:50), Max: 36.6 (09-19-24 @ 11:10)  HR: 91 (09-19-24 @ 15:50) (91 - 128)  BP: 152/73 (09-19-24 @ 15:50) (119/79 - 152/73)  RR: 18 (09-19-24 @ 15:50) (18 - 18)  SpO2: 100% (09-19-24 @ 15:50) (97% - 100%)  Wt(kg): --    Physical Exam:   GENERAL: well-groomed, well-developed, NAD  HEENT: head NC/AT; conjunctiva & sclera clear; hearing grossly intact, moist mucous membranes  NECK: supple, no JVD  RESPIRATORY: CTA B/L, no wheezing, rales, rhonchi or rubs  CARDIOVASCULAR: S1&S2, RRR, no murmurs or gallops  ABDOMEN: soft, non-tender, non-distended, + Bowel sounds x4 quadrants, no guarding, rebound or rigidity  MUSCULOSKELETAL:  no clubbing, cyanosis or edema of all 4 extremities  LYMPH: no cervical lymphadenopathy  VASCULAR: Radial pulses 2+ bilaterally, no varicose veins   SKIN: warm and dry, color normal  NEUROLOGIC: AA&O X3    Plan:  Trend H/H  -f/u GI recs regarding need for EGD  -IV protonix  -paitent encouraged to establish care with GI as outpatient for routine screening colonoscopy  -IVF as ordered  -monitor BP  -insulin pump noted-Diabetes NP consulted  -thrombocytosis likely reactive, f/u CBC    remainder as above

## 2024-09-20 ENCOUNTER — RESULT REVIEW (OUTPATIENT)
Age: 49
End: 2024-09-20

## 2024-09-20 ENCOUNTER — TRANSCRIPTION ENCOUNTER (OUTPATIENT)
Age: 49
End: 2024-09-20

## 2024-09-20 VITALS
SYSTOLIC BLOOD PRESSURE: 116 MMHG | HEART RATE: 94 BPM | RESPIRATION RATE: 16 BRPM | TEMPERATURE: 99 F | DIASTOLIC BLOOD PRESSURE: 68 MMHG | OXYGEN SATURATION: 98 %

## 2024-09-20 LAB
A1C WITH ESTIMATED AVERAGE GLUCOSE RESULT: 7.8 % — HIGH (ref 4–5.6)
ALBUMIN SERPL ELPH-MCNC: 3.3 G/DL — SIGNIFICANT CHANGE UP (ref 3.3–5)
ALP SERPL-CCNC: 72 U/L — SIGNIFICANT CHANGE UP (ref 40–120)
ALT FLD-CCNC: 45 U/L — SIGNIFICANT CHANGE UP (ref 12–78)
ANION GAP SERPL CALC-SCNC: 6 MMOL/L — SIGNIFICANT CHANGE UP (ref 5–17)
AST SERPL-CCNC: 29 U/L — SIGNIFICANT CHANGE UP (ref 15–37)
BASOPHILS # BLD AUTO: 0.08 K/UL — SIGNIFICANT CHANGE UP (ref 0–0.2)
BASOPHILS NFR BLD AUTO: 1.3 % — SIGNIFICANT CHANGE UP (ref 0–2)
BILIRUB SERPL-MCNC: 0.6 MG/DL — SIGNIFICANT CHANGE UP (ref 0.2–1.2)
BUN SERPL-MCNC: 18 MG/DL — SIGNIFICANT CHANGE UP (ref 7–23)
CALCIUM SERPL-MCNC: 9.1 MG/DL — SIGNIFICANT CHANGE UP (ref 8.5–10.1)
CHLORIDE SERPL-SCNC: 105 MMOL/L — SIGNIFICANT CHANGE UP (ref 96–108)
CO2 SERPL-SCNC: 26 MMOL/L — SIGNIFICANT CHANGE UP (ref 22–31)
CREAT SERPL-MCNC: 0.87 MG/DL — SIGNIFICANT CHANGE UP (ref 0.5–1.3)
EGFR: 81 ML/MIN/1.73M2 — SIGNIFICANT CHANGE UP
EOSINOPHIL # BLD AUTO: 0.26 K/UL — SIGNIFICANT CHANGE UP (ref 0–0.5)
EOSINOPHIL NFR BLD AUTO: 4.1 % — SIGNIFICANT CHANGE UP (ref 0–6)
ESTIMATED AVERAGE GLUCOSE: 177 MG/DL — HIGH (ref 68–114)
FERRITIN SERPL-MCNC: 47 NG/ML — SIGNIFICANT CHANGE UP (ref 13–330)
FOLATE SERPL-MCNC: 6.3 NG/ML — SIGNIFICANT CHANGE UP
GLUCOSE SERPL-MCNC: 156 MG/DL — HIGH (ref 70–99)
HCT VFR BLD CALC: 27.6 % — LOW (ref 34.5–45)
HGB BLD-MCNC: 9.3 G/DL — LOW (ref 11.5–15.5)
IMM GRANULOCYTES NFR BLD AUTO: 0.5 % — SIGNIFICANT CHANGE UP (ref 0–0.9)
IRON SATN MFR SERPL: 32 % — SIGNIFICANT CHANGE UP (ref 14–50)
IRON SATN MFR SERPL: 99 UG/DL — SIGNIFICANT CHANGE UP (ref 30–160)
LYMPHOCYTES # BLD AUTO: 1.22 K/UL — SIGNIFICANT CHANGE UP (ref 1–3.3)
LYMPHOCYTES # BLD AUTO: 19.2 % — SIGNIFICANT CHANGE UP (ref 13–44)
MCHC RBC-ENTMCNC: 30 PG — SIGNIFICANT CHANGE UP (ref 27–34)
MCHC RBC-ENTMCNC: 33.7 GM/DL — SIGNIFICANT CHANGE UP (ref 32–36)
MCV RBC AUTO: 89 FL — SIGNIFICANT CHANGE UP (ref 80–100)
MONOCYTES # BLD AUTO: 0.6 K/UL — SIGNIFICANT CHANGE UP (ref 0–0.9)
MONOCYTES NFR BLD AUTO: 9.4 % — SIGNIFICANT CHANGE UP (ref 2–14)
NEUTROPHILS # BLD AUTO: 4.17 K/UL — SIGNIFICANT CHANGE UP (ref 1.8–7.4)
NEUTROPHILS NFR BLD AUTO: 65.5 % — SIGNIFICANT CHANGE UP (ref 43–77)
NRBC # BLD: 0 /100 WBCS — SIGNIFICANT CHANGE UP (ref 0–0)
PLATELET # BLD AUTO: 383 K/UL — SIGNIFICANT CHANGE UP (ref 150–400)
POTASSIUM SERPL-MCNC: 4.1 MMOL/L — SIGNIFICANT CHANGE UP (ref 3.5–5.3)
POTASSIUM SERPL-SCNC: 4.1 MMOL/L — SIGNIFICANT CHANGE UP (ref 3.5–5.3)
PROT SERPL-MCNC: 6.8 G/DL — SIGNIFICANT CHANGE UP (ref 6–8.3)
RBC # BLD: 3.1 M/UL — LOW (ref 3.8–5.2)
RBC # FLD: 14.1 % — SIGNIFICANT CHANGE UP (ref 10.3–14.5)
SODIUM SERPL-SCNC: 137 MMOL/L — SIGNIFICANT CHANGE UP (ref 135–145)
TIBC SERPL-MCNC: 314 UG/DL — SIGNIFICANT CHANGE UP (ref 220–430)
TRANSFERRIN SERPL-MCNC: 258 MG/DL — SIGNIFICANT CHANGE UP (ref 200–360)
UIBC SERPL-MCNC: 215 UG/DL — SIGNIFICANT CHANGE UP (ref 110–370)
VIT B12 SERPL-MCNC: 1657 PG/ML — HIGH (ref 232–1245)
WBC # BLD: 6.36 K/UL — SIGNIFICANT CHANGE UP (ref 3.8–10.5)
WBC # FLD AUTO: 6.36 K/UL — SIGNIFICANT CHANGE UP (ref 3.8–10.5)

## 2024-09-20 PROCEDURE — 85018 HEMOGLOBIN: CPT

## 2024-09-20 PROCEDURE — 86900 BLOOD TYPING SEROLOGIC ABO: CPT

## 2024-09-20 PROCEDURE — 85610 PROTHROMBIN TIME: CPT

## 2024-09-20 PROCEDURE — 82607 VITAMIN B-12: CPT

## 2024-09-20 PROCEDURE — 93005 ELECTROCARDIOGRAM TRACING: CPT

## 2024-09-20 PROCEDURE — 85014 HEMATOCRIT: CPT

## 2024-09-20 PROCEDURE — 82728 ASSAY OF FERRITIN: CPT

## 2024-09-20 PROCEDURE — 85025 COMPLETE CBC W/AUTO DIFF WBC: CPT

## 2024-09-20 PROCEDURE — 96374 THER/PROPH/DIAG INJ IV PUSH: CPT

## 2024-09-20 PROCEDURE — 83690 ASSAY OF LIPASE: CPT

## 2024-09-20 PROCEDURE — 88305 TISSUE EXAM BY PATHOLOGIST: CPT | Mod: 26

## 2024-09-20 PROCEDURE — 82272 OCCULT BLD FECES 1-3 TESTS: CPT

## 2024-09-20 PROCEDURE — 80053 COMPREHEN METABOLIC PANEL: CPT

## 2024-09-20 PROCEDURE — 82962 GLUCOSE BLOOD TEST: CPT

## 2024-09-20 PROCEDURE — 83550 IRON BINDING TEST: CPT

## 2024-09-20 PROCEDURE — 36415 COLL VENOUS BLD VENIPUNCTURE: CPT

## 2024-09-20 PROCEDURE — 88305 TISSUE EXAM BY PATHOLOGIST: CPT

## 2024-09-20 PROCEDURE — 84466 ASSAY OF TRANSFERRIN: CPT

## 2024-09-20 PROCEDURE — 99233 SBSQ HOSP IP/OBS HIGH 50: CPT

## 2024-09-20 PROCEDURE — 88312 SPECIAL STAINS GROUP 1: CPT

## 2024-09-20 PROCEDURE — 83540 ASSAY OF IRON: CPT

## 2024-09-20 PROCEDURE — 83036 HEMOGLOBIN GLYCOSYLATED A1C: CPT

## 2024-09-20 PROCEDURE — 85730 THROMBOPLASTIN TIME PARTIAL: CPT

## 2024-09-20 PROCEDURE — 99285 EMERGENCY DEPT VISIT HI MDM: CPT

## 2024-09-20 PROCEDURE — 71045 X-RAY EXAM CHEST 1 VIEW: CPT

## 2024-09-20 PROCEDURE — 82746 ASSAY OF FOLIC ACID SERUM: CPT

## 2024-09-20 PROCEDURE — 86850 RBC ANTIBODY SCREEN: CPT

## 2024-09-20 PROCEDURE — 85027 COMPLETE CBC AUTOMATED: CPT

## 2024-09-20 PROCEDURE — 88312 SPECIAL STAINS GROUP 1: CPT | Mod: 26

## 2024-09-20 PROCEDURE — 86901 BLOOD TYPING SEROLOGIC RH(D): CPT

## 2024-09-20 RX ORDER — PANTOPRAZOLE SODIUM 40 MG
1 TABLET, DELAYED RELEASE (ENTERIC COATED) ORAL
Qty: 30 | Refills: 0
Start: 2024-09-20 | End: 2024-10-04

## 2024-09-20 RX ORDER — PANTOPRAZOLE SODIUM 40 MG
40 TABLET, DELAYED RELEASE (ENTERIC COATED) ORAL EVERY 12 HOURS
Refills: 0 | Status: DISCONTINUED | OUTPATIENT
Start: 2024-09-20 | End: 2024-09-20

## 2024-09-20 RX ADMIN — LISINOPRIL 20 MILLIGRAM(S): 10 TABLET ORAL at 05:22

## 2024-09-20 RX ADMIN — Medication 40 MILLIGRAM(S): at 17:56

## 2024-09-20 RX ADMIN — Medication 75 MILLILITER(S): at 09:29

## 2024-09-20 RX ADMIN — Medication 40 MILLIGRAM(S): at 05:22

## 2024-09-20 NOTE — PROVIDER CONTACT NOTE (OTHER) - SITUATION
blood sugar result at 1210 was 198,pt on insulin pump please advise,pt NPO
RN messages Jonathan Chinchilla diabetic educator via teams [employer] requesting he contact us to let us know plan for insulin pump since pt going to endo later today
pt going to endo at approx 2;30 today per endo and she is NPO, has insulin pump on. RN concerned pt blood sugar will drop. please advise.

## 2024-09-20 NOTE — PROVIDER CONTACT NOTE (OTHER) - REASON
pt going to endo at approx 2;30 today per endo and she is NPO, has insulin pump on
pt on insulin pump going to endo later today
blood sugar result at 1210 was 198

## 2024-09-20 NOTE — PROGRESS NOTE ADULT - SUBJECTIVE AND OBJECTIVE BOX
Patient is a 48y old  Female who presents with a chief complaint of GI Bleed (19 Sep 2024 17:42)       INTERVAL HPI/OVERNIGHT EVENTS: Patient seen and examined at bedside. Denies new symptoms, complaints. No chest pain, palpitations, chest pain, sob.     MEDICATIONS  (STANDING):  amLODIPine   Tablet 10 milliGRAM(s) Oral daily  atorvastatin 20 milliGRAM(s) Oral daily  dextrose 5%. 1000 milliLiter(s) (100 mL/Hr) IV Continuous <Continuous>  dextrose 5%. 1000 milliLiter(s) (50 mL/Hr) IV Continuous <Continuous>  dextrose 50% Injectable 12.5 Gram(s) IV Push once  dextrose 50% Injectable 25 Gram(s) IV Push once  dextrose 50% Injectable 25 Gram(s) IV Push once  glucagon  Injectable 1 milliGRAM(s) IntraMuscular once  hydrochlorothiazide 25 milliGRAM(s) Oral daily  insulin lispro (HumaLOG) Pump 0.05 Each SubCutaneous Continuous Pump  lisinopril 20 milliGRAM(s) Oral daily  metoprolol tartrate 100 milliGRAM(s) Oral daily  pantoprazole  Injectable 40 milliGRAM(s) IV Push every 12 hours  sodium chloride 0.9%. 1000 milliLiter(s) (75 mL/Hr) IV Continuous <Continuous>    MEDICATIONS  (PRN):  acetaminophen     Tablet .. 650 milliGRAM(s) Oral every 6 hours PRN Temp greater or equal to 38C (100.4F), Mild Pain (1 - 3)  aluminum hydroxide/magnesium hydroxide/simethicone Suspension 30 milliLiter(s) Oral every 4 hours PRN Dyspepsia  dextrose Oral Gel 15 Gram(s) Oral once PRN Blood Glucose LESS THAN 70 milliGRAM(s)/deciliter  melatonin 3 milliGRAM(s) Oral at bedtime PRN Insomnia  ondansetron Injectable 4 milliGRAM(s) IV Push every 8 hours PRN Nausea and/or Vomiting      Allergies    penicillin (Rash)    Intolerances        REVIEW OF SYSTEMS:  Per HPI. All other ROS noted Negative     Vital Signs Last 24 Hrs  T(C): 36.7 (20 Sep 2024 05:18), Max: 37.2 (19 Sep 2024 21:31)  T(F): 98 (20 Sep 2024 05:18), Max: 98.9 (19 Sep 2024 21:31)  HR: 67 (20 Sep 2024 05:18) (67 - 128)  BP: 100/59 (20 Sep 2024 05:18) (100/59 - 152/73)  BP(mean): --  RR: 18 (20 Sep 2024 05:18) (18 - 18)  SpO2: 98% (20 Sep 2024 05:18) (97% - 100%)    Parameters below as of 20 Sep 2024 05:18  Patient On (Oxygen Delivery Method): room air        PHYSICAL EXAM:  GENERAL: NAD, well-groomed, well-developed  HEAD:  Atraumatic, Normocephalic  EYES: EOMI, PERRLA, conjunctiva and sclera clear  ENMT: No tonsillar erythema, exudates, or enlargement; Moist mucous membranes, Good dentition, No lesions  NECK: Supple, No JVD, Normal thyroid  NERVOUS SYSTEM:  Alert & Oriented X3, Good concentration; Motor Strength 5/5 B/L upper and lower extremities; DTRs 2+ intact and symmetric  CHEST/LUNG: Clear to auscultation bilaterally; No rales, rhonchi, wheezing, or rubs  HEART: Regular rate and rhythm; No murmurs, rubs, or gallops  ABDOMEN: Soft, Nontender, Nondistended; Bowel sounds present  EXTREMITIES:  2+ Peripheral Pulses, No clubbing, cyanosis, or edema  LYMPH: No lymphadenopathy noted  SKIN: No rashes or lesions    LABS:                        9.0    x     )-----------( x        ( 19 Sep 2024 20:36 )             28.0     19 Sep 2024 11:40    133    |  103    |  36     ----------------------------<  332    4.5     |  22     |  1.20     Ca    9.5        19 Sep 2024 11:40    TPro  7.8    /  Alb  3.5    /  TBili  0.5    /  DBili  x      /  AST  23     /  ALT  47     /  AlkPhos  92     19 Sep 2024 11:40    PT/INR - ( 19 Sep 2024 11:40 )   PT: 11.5 sec;   INR: 1.01 ratio         PTT - ( 19 Sep 2024 11:40 )  PTT:26.9 sec  CAPILLARY BLOOD GLUCOSE      POCT Blood Glucose.: 167 mg/dL (20 Sep 2024 07:48)  POCT Blood Glucose.: 143 mg/dL (19 Sep 2024 21:51)  POCT Blood Glucose.: 127 mg/dL (19 Sep 2024 18:00)    BLOOD CULTURE    RADIOLOGY & ADDITIONAL TESTS:    Imaging Personally Reviewed:  [ ] YES     Consultant(s) Notes Reviewed:      Care Discussed with Consultants/Other Providers:

## 2024-09-20 NOTE — PROVIDER CONTACT NOTE (OTHER) - RECOMMENDATIONS
RN sent message to Yvette BENSON  NP as above noted, yvette verified receipt of message no new orders to note at this time

## 2024-09-20 NOTE — CARE COORDINATION ASSESSMENT. - NSCAREPROVIDERS_GEN_ALL_CORE_FT
CARE PROVIDERS:  Accepting Physician: Juan Luis Gerber  Administration: Ye Iqbal  Admitting: Juan Luis Gerber  Attending: Popeye Thrasher  Case Management: Caitlin Ferro  Consultant: Gerardo Muñoz  Consultant: oJnathan Chinchilla  Consultant: Niki Paulson  ED Attending: Mulu Castillo  ED Nurse: Mary Bragg  Nurse: Jessica Carreno  Ordered: Doctor, Unknown  Override: Kimo, Farheen  Override: Sherrie Cheung  Override: Mayra Dinh  Override: Марина Mittal  PCA/Nursing Assistant: Eli Mendez  Primary Team: Gabriela Mcguire  Primary Team: Teresa Armstrong  Primary Team: Juan Luis Gerber  Primary Team: Tia Bacon  Primary Team: Popeye Thrasher  Primary Team: Agustin Graham  Primary Team: Heron Rene  Registered Dietitian: Neda Edwards  : Ratna Victoria  Team: [facility], Team

## 2024-09-20 NOTE — PROVIDER CONTACT NOTE (OTHER) - RECOMMENDATIONS
RN messaged DR Thrasher via teams [employer] above note message.  verified receipt of the message and stated she will place order for IV fluids pt made aware and agreeable with plan

## 2024-09-20 NOTE — DISCHARGE NOTE PROVIDER - NSDCMRMEDTOKEN_GEN_ALL_CORE_FT
amLODIPine 10 mg oral tablet: 1 tab(s) orally once a day (in the morning)  atorvastatin 20 mg oral tablet: 1 tab(s) orally once a day (in the morning)  Insulin Pump with Humalog:   lisinopril-hydrochlorothiazide 20 mg-25 mg oral tablet: 1 tab(s) orally once a day (in the morning)  metoprolol tartrate 100 mg oral tablet: 1 tab(s) orally once a day (in the morning)  pantoprazole 40 mg oral delayed release tablet: 1 tab(s) orally every 12 hours

## 2024-09-20 NOTE — DISCHARGE NOTE NURSING/CASE MANAGEMENT/SOCIAL WORK - PATIENT PORTAL LINK FT
You can access the FollowMyHealth Patient Portal offered by [facility] by registering at the following website: [email or web address]. By joining [facility]’s FollowMyHealth portal, you will also be able to view your health information using other applications (apps) compatible with our system.

## 2024-09-20 NOTE — PROVIDER CONTACT NOTE (OTHER) - BACKGROUND
blood sugar result at 1210 was 198,pt on insulin pump please advise,pt NPO
pt going to endo at approx 2;30 today per endo and she is NPO, has insulin pump on. RN concerned pt blood sugar will drop. please advise.
RN messages Jonathan Chinchilla diabetic educator via teams zebra requesting he contact us to let us know plan for insulin pump since pt going to endo later today

## 2024-09-20 NOTE — CARE COORDINATION ASSESSMENT. - NSPASTMEDSURGHISTORY_GEN_ALL_CORE_FT
PAST MEDICAL & SURGICAL HISTORY:  Adenomyosis of the uterus      Hyperlipidemia      Hypertension      Type 1 diabetes mellitus      S/P wisdom tooth extraction      S/P hysterectomy

## 2024-09-20 NOTE — DISCHARGE NOTE PROVIDER - HOSPITAL COURSE
Patient admitted for GIB w/u. Stool for OB +. She was seen by GI and had EGD performed that showed 2 pre pyloric ulcers. Biopsy taken and out patient f/u is recommended. Diet advanced and she tolerated well. Patient to f/u with GI in 1 week. VSS

## 2024-09-20 NOTE — PROGRESS NOTE ADULT - ASSESSMENT
48F with PMH of DM1, on insulin pump, HTN, HLD, adenomyosis of the uterus s/p hysterectomy presents to the ED for tarry stools and is being admitted for acute GI bleed.

## 2024-09-20 NOTE — CARE COORDINATION ASSESSMENT. - IS THE PATIENT CAPABLE OF MANAGING OWN FINANCES?
SPOKE WITH PT, GAVE RESULTS  HE STATED THAT HIS BOWLS ARE STILL A LITTLE LOOSE BUT HE'S TRYING TO STICK WITH A BLAND DIET  Yes

## 2024-09-20 NOTE — PROVIDER CONTACT NOTE (OTHER) - RECOMMENDATIONS
see above awaiting Jonathan return call see above awaiting Jonathan return call. Jonathan aware spoke with RN at this time. Jonathan stated he spoke with pt last night, plan is to eval blood sugar at 1200 depending may decrease her basal insulin

## 2024-09-20 NOTE — PROVIDER CONTACT NOTE (OTHER) - ASSESSMENT
blood sugar result at 1210 was 198,pt on insulin pump please advise,pt NPO
pt going to endo at approx 2;30 today per endo and she is NPO, has insulin pump on. RN concerned pt blood sugar will drop. please advise.
pt going to endo later today has insulin pump on

## 2024-09-20 NOTE — DISCHARGE NOTE PROVIDER - CARE PROVIDER_API CALL
Prince Hernandez  Gastroenterology  [address] [place] [id number]  Phone: [phone]  Fax: [phone]  Follow Up Time:

## 2024-09-20 NOTE — PROGRESS NOTE ADULT - PROBLEM SELECTOR PLAN 1
Patient presents with 3 episodes of tarry stools likely 2/2 GIB, had a recent hysterectomy 3 wks ago  - H/H 10.8/33.9on admission, repeat H/H 9.8/30.7, baseline hemoglobin 12  - Monitor h/h   - NPO for EGD today   - Protonix 40 mg IVP BID  - Currently hemodynamically stable, monitor for signs and symptoms of active bleeding  - Consider transfusion for hemoglobin <7 (or <8 if history of CAD)  - Type and screen up to date  - F/u vitamin B12, folate, iron panel: iron, ferritin, TIBC, transferrin  - GI (Dr. Muñoz) consulted, f/u recs

## 2024-09-20 NOTE — CARE COORDINATION ASSESSMENT. - OTHER PERTINENT DISCHARGE PLANNING INFORMATION:
pta pt living w spouse and mother in law. pt states she is independent w adl's. Pt states she is unemployed at this time. Pt states she is coping well with hospitalization and looking forward to ret home . sw to follow for any needs but no anticipated needs anticipated

## 2024-09-20 NOTE — CASE MANAGEMENT PROGRESS NOTE - NSCMPROGRESSNOTE_GEN_ALL_CORE
From home independent. Pt adm with GI bleed. For ENDO today at 2:30PM. Independent with insulin pump. NN anticipated for DC once medically cleared. Spouse to transport her home.

## 2024-09-20 NOTE — DISCHARGE NOTE PROVIDER - NSDCCPCAREPLAN_GEN_ALL_CORE_FT
PRINCIPAL DISCHARGE DIAGNOSIS  Diagnosis: GI bleed  Assessment and Plan of Treatment: Please continue Protonix   f/u with GI for biopsy results of the pre pyloric ulcers found on endoecopy  f/u with PCP, endocrinologist and all your doctors  resume home meds

## 2024-09-20 NOTE — DISCHARGE NOTE NURSING/CASE MANAGEMENT/SOCIAL WORK - NSTRANSFERBELONGINGSDISPO_GEN_A_NUR
pt has all her belongings at time of d/c to home including her cell phone, , I pad,, eye glasses/with patient

## 2024-09-20 NOTE — PROVIDER CONTACT NOTE (OTHER) - ACTION/TREATMENT ORDERED:
no new orders to note at this time
no new orders to note at this time
pt aware and agreeable with plan

## 2024-09-21 ENCOUNTER — TRANSCRIPTION ENCOUNTER (OUTPATIENT)
Age: 49
End: 2024-09-21

## 2024-09-24 LAB — SURGICAL PATHOLOGY STUDY: SIGNIFICANT CHANGE UP

## (undated) DEVICE — TRAP QUICK CATCH  SINGL CHAMBER

## (undated) DEVICE — SYR ALLIANCE II INFLATION 60ML

## (undated) DEVICE — Device

## (undated) DEVICE — SUT POLYSORB 4-0 18" P-12 UNDYED

## (undated) DEVICE — FORMALIN CUPS 10% BUFFERED

## (undated) DEVICE — PACK IV START WITH CHG

## (undated) DEVICE — SOL IRR NS 0.9% 250ML

## (undated) DEVICE — SNARE POLYP SENS 27MM 240CM

## (undated) DEVICE — TUBING IV SET SECOND 34" W/O LOK-BLUNT

## (undated) DEVICE — UTERINE MANIPULATOR COOPER SURGICAL 5MM 33CM GREEN

## (undated) DEVICE — SOL IRR POUR NS 0.9% 1000ML

## (undated) DEVICE — ELCTR GROUNDING PAD ADULT COVIDIEN

## (undated) DEVICE — MARKER ENDO SPOT EX

## (undated) DEVICE — SUT POLYSORB 0 30" GU-46

## (undated) DEVICE — PLV/PSP-SUCTION IRRIGATOR STRYKER: Type: DURABLE MEDICAL EQUIPMENT

## (undated) DEVICE — CATH IV SAFE BC 22G X 1" (BLUE)

## (undated) DEVICE — SOL INJ NS 0.9% 1000ML

## (undated) DEVICE — GLV 8 PROTEXIS (WHITE)

## (undated) DEVICE — ENDOCATCH 10MM SPECIMEN POUCH

## (undated) DEVICE — BRUSH COLONOSCOPY CYTOLOGY

## (undated) DEVICE — TUBING SUCTION CONN 6FT STERILE

## (undated) DEVICE — SOL IRR BAG H2O 2000ML

## (undated) DEVICE — SUCTION YANKAUER TAPERED BULBOUS NO VENT

## (undated) DEVICE — MASK O2 NON REBREATH 3IN1 ADULT

## (undated) DEVICE — NDL INJ SCLERO INTERJECT 23G

## (undated) DEVICE — GOWN TRIMAX XXL

## (undated) DEVICE — SUT POLYSORB 2-0 30" GU-46

## (undated) DEVICE — TUBING IV SET GRAVITY 3Y 100" MACRO

## (undated) DEVICE — SYR LUER LOK 10CC

## (undated) DEVICE — TUBE O2 SUPL CRUSH RESIS CONN SOUTHSIDE ONLY

## (undated) DEVICE — BITE BLOCK MAXI RUBBER STAMP

## (undated) DEVICE — CATH ELECHMSTAT  INJ 7FR 210CM

## (undated) DEVICE — STERIS DEFENDO 3-PIECE KIT (AIR/WATER, SUCTION & BIOPSY VALVES)

## (undated) DEVICE — TROCAR COVIDIEN VERSAPORT BLADELESS OPTICAL 12MM STANDARD

## (undated) DEVICE — RADIAL JAW 4 LG CAPACITY WITH NDL

## (undated) DEVICE — TUBING IRR SET FOR CYSTOSCOPY 77"

## (undated) DEVICE — PLV/PSP-ESU FORCE2 FIL 16736T: Type: DURABLE MEDICAL EQUIPMENT

## (undated) DEVICE — SYR LUER SLIP TIP 30CC

## (undated) DEVICE — FORCEP RADIAL JAW 4 W NDL 2.2MM 2.8MM 160CM YELLOW DISP

## (undated) DEVICE — PLV-SCD MACHINE: Type: DURABLE MEDICAL EQUIPMENT

## (undated) DEVICE — WARMING BLANKET UPPER ADULT

## (undated) DEVICE — SOL IRR POUR H2O 500ML

## (undated) DEVICE — CATH IV SAFE BC 20G X 1.16" (PINK)

## (undated) DEVICE — SET IV PUMP BLOOD 1VALVE 180FILTER NON-DEHP

## (undated) DEVICE — TROCAR COVIDIEN VERSAPORT BLADELESS OPTICAL 5MM STANDARD

## (undated) DEVICE — CATH ELCTR GLIDE PRB 7FR

## (undated) DEVICE — DRAPE 3/4 SHEET 52X76"

## (undated) DEVICE — TUBING PLUME AWAY 4.0

## (undated) DEVICE — PACK GYN LAPAROSCOPY

## (undated) DEVICE — BRUSH CYTO ENDO

## (undated) DEVICE — VENODYNE/SCD SLEEVE CALF LARGE

## (undated) DEVICE — TUBING CANNULA SALTER LABS NASAL ADULT 7FT

## (undated) DEVICE — SYR LUER LOK 30CC

## (undated) DEVICE — DRSG CURITY GAUZE SPONGE 4 X 4" 12-PLY

## (undated) DEVICE — VALVE BIOPSY

## (undated) DEVICE — VENODYNE/SCD SLEEVE CALF MEDIUM

## (undated) DEVICE — CANISTER SUCTION 1200CC 10/SL

## (undated) DEVICE — SYR IV POSIFLUSH NS 3ML 30/TY

## (undated) DEVICE — SENSOR O2 FINGER XL ADULT 24/BX 6BX/CA

## (undated) DEVICE — DRSG MASTISOL